# Patient Record
Sex: MALE | Race: WHITE | NOT HISPANIC OR LATINO | Employment: OTHER | URBAN - METROPOLITAN AREA
[De-identification: names, ages, dates, MRNs, and addresses within clinical notes are randomized per-mention and may not be internally consistent; named-entity substitution may affect disease eponyms.]

---

## 2017-09-22 ENCOUNTER — GENERIC CONVERSION - ENCOUNTER (OUTPATIENT)
Dept: OTHER | Facility: OTHER | Age: 42
End: 2017-09-22

## 2018-01-14 NOTE — PROGRESS NOTES
Patient Health Assessment    Date:            09/22/2017  Blood Pressure:  126/79  Pulse:           50  Age:             42  Weight:          160 lbs  Height/Length:   5' 10"  Body Mass Index: 23 0  Provider:        BETSY  Clinic:          [facility]  Medical Alert: Tobacco User    Mental Disorders    Stroke  Medications: Buspar    Paxil    Since Last Visit: Medical Alert: No Change    Medications: No Change    Allergies:        No Change  Pain Scale Type: Numeric Pain ScalePain Level: 0  Description:     Pt returns after an number of years  CC Pain in 10 and 11,  O: Badly worn down teeth and rocco ruby are present  A: Wear is secondary to mental condition and can not be corrected with out  extensive dental care  The cost of the care is out of his range  PLAN:    I will try to maintain his case w resins and treat teeth prn symptoms  Pt needs prophy w attention to the calculus on mesial of # 2 which is super  erupted       Simple restorative needs to be done   nv  start w resins on 10 and 11    ----- Signed on Friday, September 22, 2017 at 11:07:56 AM  -----  ----- Provider: BETSY - Ramón York DMD -- Clinic: [facility] -----

## 2018-03-08 ENCOUNTER — DOCTOR'S OFFICE (OUTPATIENT)
Dept: URBAN - METROPOLITAN AREA CLINIC 137 | Facility: CLINIC | Age: 43
Setting detail: OPHTHALMOLOGY
End: 2018-03-08
Payer: COMMERCIAL

## 2018-03-08 ENCOUNTER — RX ONLY (RX ONLY)
Age: 43
End: 2018-03-08

## 2018-03-08 ENCOUNTER — OPTICAL OFFICE (OUTPATIENT)
Dept: URBAN - METROPOLITAN AREA CLINIC 146 | Facility: CLINIC | Age: 43
Setting detail: OPHTHALMOLOGY
End: 2018-03-08
Payer: COMMERCIAL

## 2018-03-08 DIAGNOSIS — H52.4: ICD-10-CM

## 2018-03-08 DIAGNOSIS — H53.001: ICD-10-CM

## 2018-03-08 DIAGNOSIS — H52.223: ICD-10-CM

## 2018-03-08 PROCEDURE — V2103 SPHEROCYLINDR 4.00D/12-2.00D: HCPCS | Performed by: OPHTHALMOLOGY

## 2018-03-08 PROCEDURE — 92004 COMPRE OPH EXAM NEW PT 1/>: CPT | Performed by: OPHTHALMOLOGY

## 2018-03-08 PROCEDURE — V2020 VISION SVCS FRAMES PURCHASES: HCPCS | Performed by: OPHTHALMOLOGY

## 2018-03-08 ASSESSMENT — REFRACTION_AUTOREFRACTION
OS_AXIS: 172
OD_SPHERE: +3.50
OS_CYLINDER: +0.50
OS_SPHERE: +2.00
OD_CYLINDER: +1.25
OD_AXIS: 008

## 2018-03-08 ASSESSMENT — REFRACTION_MANIFEST
OS_VA2: 20/
OD_VA2: 20/
OD_VA2: 20/
OS_VA1: 20/
OS_VA1: 20/
OD_VA1: 20/
OD_VA3: 20/
OU_VA: 20/
OS_VA2: 20/
OS_VA3: 20/
OU_VA: 20/
OS_VA3: 20/
OD_VA1: 20/
OD_VA3: 20/

## 2018-03-08 ASSESSMENT — REFRACTION_OUTSIDERX
OD_SPHERE: +2.75
OS_CYLINDER: +0.50
OS_ADD: +1.00
OS_VA3: 20/
OS_VA2: 20/20(J1+)
OD_VA2: 20/20(J1+)
OD_VA3: 20/
OS_AXIS: 170
OS_SPHERE: +2.00
OD_AXIS: 10
OS_VA1: 20/20
OD_ADD: +1.00
OD_VA1: 20/25
OU_VA: 20/20-1
OD_CYLINDER: +0.75

## 2018-03-08 ASSESSMENT — REFRACTION_CURRENTRX
OD_OVR_VA: 20/
OS_OVR_VA: 20/
OD_OVR_VA: 20/
OS_OVR_VA: 20/
OD_OVR_VA: 20/
OS_OVR_VA: 20/

## 2018-03-08 ASSESSMENT — CONFRONTATIONAL VISUAL FIELD TEST (CVF)
OD_FINDINGS: FULL
OS_FINDINGS: FULL

## 2018-03-08 ASSESSMENT — VISUAL ACUITY
OS_BCVA: 20/200
OD_BCVA: 20/40

## 2018-03-08 ASSESSMENT — SPHEQUIV_DERIVED
OS_SPHEQUIV: 2.25
OD_SPHEQUIV: 4.125

## 2020-01-14 ENCOUNTER — OPTICAL OFFICE (OUTPATIENT)
Dept: URBAN - METROPOLITAN AREA CLINIC 146 | Facility: CLINIC | Age: 45
Setting detail: OPHTHALMOLOGY
End: 2020-01-14
Payer: COMMERCIAL

## 2020-01-14 ENCOUNTER — DOCTOR'S OFFICE (OUTPATIENT)
Dept: URBAN - METROPOLITAN AREA CLINIC 137 | Facility: CLINIC | Age: 45
Setting detail: OPHTHALMOLOGY
End: 2020-01-14
Payer: COMMERCIAL

## 2020-01-14 VITALS — HEIGHT: 60 IN

## 2020-01-14 DIAGNOSIS — H52.4: ICD-10-CM

## 2020-01-14 DIAGNOSIS — H52.03: ICD-10-CM

## 2020-01-14 DIAGNOSIS — H52.223: ICD-10-CM

## 2020-01-14 PROBLEM — H04.123 DRY EYE; BOTH EYES: Status: ACTIVE | Noted: 2018-03-08

## 2020-01-14 PROBLEM — H53.001 AMBLYOPIA NOS; RIGHT EYE: Status: ACTIVE | Noted: 2018-03-08

## 2020-01-14 PROCEDURE — V2203 LENS SPHCYL BIFOCAL 4.00D/.1: HCPCS | Performed by: OPTOMETRIST

## 2020-01-14 PROCEDURE — V2020 VISION SVCS FRAMES PURCHASES: HCPCS | Performed by: OPTOMETRIST

## 2020-01-14 PROCEDURE — 92014 COMPRE OPH EXAM EST PT 1/>: CPT | Performed by: OPTOMETRIST

## 2020-01-14 PROCEDURE — V2207 LENS SPHCY BIFOCAL 4.25-7D/.: HCPCS | Performed by: OPTOMETRIST

## 2020-01-14 ASSESSMENT — SPHEQUIV_DERIVED
OD_SPHEQUIV: 3.875
OS_SPHEQUIV: 2.375
OD_SPHEQUIV: 3.625
OS_SPHEQUIV: 2.25
OS_SPHEQUIV: 2.25
OD_SPHEQUIV: 3.125

## 2020-01-14 ASSESSMENT — REFRACTION_MANIFEST
OU_VA: 20/20-1
OS_VA1: 20/20
OD_VA1: 20/25
OS_SPHERE: +2.75
OD_SPHERE: +2.75
OS_ADD: +1.75
OD_AXIS: 10
OS_AXIS: 170
OD_AXIS: 105
OS_VA3: 20/
OU_VA: 20/
OD_ADD: +1.75
OD_VA3: 20/
OD_VA1: 20/25
OD_CYLINDER: -1.25
OS_VA2: 20/20(J1+)
OS_CYLINDER: -0.75
OS_ADD: +1.00
OD_CYLINDER: +0.75
OD_VA3: 20/
OS_CYLINDER: +0.50
OS_VA1: 20/20
OS_VA2: 20/
OD_SPHERE: +4.25
OS_VA3: 20/
OD_VA2: 20/20(J1+)
OD_ADD: +1.00
OD_VA2: 20/
OS_AXIS: 85
OS_SPHERE: +2.00

## 2020-01-14 ASSESSMENT — REFRACTION_CURRENTRX
OS_SPHERE: +2.75
OD_CYLINDER: -1.00
OS_AXIS: 079
OD_SPHERE: +3.75
OD_OVR_VA: 20/
OD_AXIS: 100
OS_OVR_VA: 20/
OS_CYLINDER: -0.50

## 2020-01-14 ASSESSMENT — REFRACTION_AUTOREFRACTION
OD_CYLINDER: -1.75
OS_SPHERE: +2.50
OD_AXIS: 100
OS_AXIS: 083
OS_CYLINDER: -0.50
OD_SPHERE: +4.75

## 2020-01-14 ASSESSMENT — VISUAL ACUITY
OS_BCVA: 20/40
OD_BCVA: 20/25-1

## 2020-01-14 ASSESSMENT — CONFRONTATIONAL VISUAL FIELD TEST (CVF)
OD_FINDINGS: FULL
OS_FINDINGS: FULL

## 2021-06-30 ENCOUNTER — HOSPITAL ENCOUNTER (OUTPATIENT)
Dept: RADIOLOGY | Facility: HOSPITAL | Age: 46
Discharge: HOME | End: 2021-06-30
Attending: FAMILY MEDICINE
Payer: COMMERCIAL

## 2021-06-30 DIAGNOSIS — J06.9 ACUTE UPPER RESPIRATORY INFECTION: ICD-10-CM

## 2021-06-30 PROCEDURE — 71046 X-RAY EXAM CHEST 2 VIEWS: CPT

## 2021-10-05 ENCOUNTER — HOSPITAL ENCOUNTER (OUTPATIENT)
Dept: RADIOLOGY | Facility: HOSPITAL | Age: 46
Discharge: HOME | End: 2021-10-05
Attending: FAMILY MEDICINE
Payer: COMMERCIAL

## 2021-10-05 DIAGNOSIS — M79.671 PAIN IN BOTH FEET: ICD-10-CM

## 2021-10-05 DIAGNOSIS — M79.672 PAIN IN BOTH FEET: ICD-10-CM

## 2021-10-05 PROCEDURE — 73630 X-RAY EXAM OF FOOT: CPT

## 2021-12-03 ENCOUNTER — TELEPHONE (OUTPATIENT)
Dept: SLEEP CENTER | Facility: CLINIC | Age: 46
End: 2021-12-03

## 2022-04-29 ENCOUNTER — HOSPITAL ENCOUNTER (EMERGENCY)
Facility: HOSPITAL | Age: 47
Discharge: HOME | End: 2022-04-29
Attending: EMERGENCY MEDICINE
Payer: COMMERCIAL

## 2022-04-29 VITALS
SYSTOLIC BLOOD PRESSURE: 146 MMHG | TEMPERATURE: 98.3 F | WEIGHT: 165 LBS | OXYGEN SATURATION: 99 % | DIASTOLIC BLOOD PRESSURE: 83 MMHG | RESPIRATION RATE: 16 BRPM | HEIGHT: 70 IN | HEART RATE: 92 BPM | BODY MASS INDEX: 23.62 KG/M2

## 2022-04-29 DIAGNOSIS — S01.01XA SCALP LACERATION: Primary | ICD-10-CM

## 2022-04-29 PROCEDURE — 90715 TDAP VACCINE 7 YRS/> IM: CPT | Performed by: EMERGENCY MEDICINE

## 2022-04-29 PROCEDURE — 12002 RPR S/N/AX/GEN/TRNK2.6-7.5CM: CPT | Performed by: EMERGENCY MEDICINE

## 2022-04-29 PROCEDURE — 90471 IMMUNIZATION ADMIN: CPT

## 2022-04-29 PROCEDURE — 99282 EMERGENCY DEPT VISIT SF MDM: CPT | Performed by: EMERGENCY MEDICINE

## 2022-04-29 PROCEDURE — 99282 EMERGENCY DEPT VISIT SF MDM: CPT

## 2022-04-29 RX ORDER — ROPIVACAINE HYDROCHLORIDE 5 MG/ML
10 INJECTION, SOLUTION EPIDURAL; INFILTRATION; PERINEURAL ONCE
Status: COMPLETED | OUTPATIENT
Start: 2022-04-29 | End: 2022-04-29

## 2022-04-29 RX ADMIN — ROPIVACAINE HYDROCHLORIDE 10 ML: 5 INJECTION, SOLUTION EPIDURAL; INFILTRATION; PERINEURAL at 17:55

## 2022-04-29 RX ADMIN — TETANUS TOXOID, REDUCED DIPHTHERIA TOXOID AND ACELLULAR PERTUSSIS VACCINE, ADSORBED 0.5 ML: 5; 2.5; 8; 8; 2.5 SUSPENSION INTRAMUSCULAR at 17:40

## 2022-04-29 NOTE — DISCHARGE INSTRUCTIONS
Follow-up with your primary care provider in approximately 10 days for removal of your stitches  Five stitches were placed  Come back to emergency department if he has signs of infection including fever, spreading redness, pus discharge      Come back to emergency department if you have signs of an intracranial bleed such as stroke like symptoms (change in vision, hearing, numbness, tingling, focal weakness, facial droop)

## 2022-04-29 NOTE — Clinical Note
Thania Melendez was seen and treated in our emergency department on 4/29/2022  Diagnosis: Laceration    Abi Guest  may return to work on return date  He may return on this date: 04/30/2022         If you have any questions or concerns, please don't hesitate to call        Yassine Max, DO    ______________________________           _______________          _______________  Hospital Representative                              Date                                Time

## 2022-04-29 NOTE — ED PROVIDER NOTES
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  Pt  States no LOC, no nausea, no vomiting     27-year-old male not up-to-date on tetanus  Patient presents for laceration to head  Had a metal bed frame fall onto the upper right side of his scalp  Patient was standing when he was struck by the metal frame which fell from the wall or standing up  Not a large fall  Less than a foot  Pain/ bleeding is controlled this time  No chest pain, shortness of breath, dizziness, loss consciousness, neurological deficits  Fall was from a small height  Patient would prefer not have a CT scan of his head  Head Laceration  Location:  Head/neck  Head/neck laceration location:  Scalp  Length:  5 cm  Depth: Through dermis  Bleeding: venous and controlled    Laceration mechanism:  Blunt object  Pain details:     Quality:  Aching    Severity:  Mild    Timing:  Constant    Progression:  Unchanged  Foreign body present:  No foreign bodies      None       History reviewed  No pertinent past medical history  History reviewed  No pertinent surgical history  History reviewed  No pertinent family history  I have reviewed and agree with the history as documented  E-Cigarette/Vaping    E-Cigarette Use Never User      E-Cigarette/Vaping Substances     Social History     Tobacco Use    Smoking status: Current Every Day Smoker     Packs/day: 0 50     Years: 41 00     Pack years: 20 50    Smokeless tobacco: Never Used   Vaping Use    Vaping Use: Never used   Substance Use Topics    Alcohol use: Yes     Comment: Socially- Holiday 1-2 times per month    Drug use: Not Currently       Review of Systems   Skin: Positive for wound  All other systems reviewed and are negative  Physical Exam  Physical Exam  Vitals and nursing note reviewed  Constitutional:       General: He is not in acute distress       Appearance: He is well-developed  He is not diaphoretic  HENT:      Head: Normocephalic  Right Ear: External ear normal       Left Ear: External ear normal    Eyes:      Conjunctiva/sclera: Conjunctivae normal    Neck:      Vascular: No JVD  Trachea: No tracheal deviation  Cardiovascular:      Rate and Rhythm: Normal rate and regular rhythm  Heart sounds: Normal heart sounds  No murmur heard  Pulmonary:      Effort: No respiratory distress  Breath sounds: Normal breath sounds  No stridor  No wheezing or rales  Abdominal:      General: Bowel sounds are normal  There is no distension  Palpations: Abdomen is soft  There is no mass  Tenderness: There is no abdominal tenderness  There is no guarding or rebound  Musculoskeletal:         General: No tenderness or deformity  Skin:     General: Skin is warm and dry  Capillary Refill: Capillary refill takes less than 2 seconds  Coloration: Skin is not pale  Findings: No erythema or rash  Comments: Proximally 5 cm wound to the right frontal region just posterior to the scalp line  Neurological:      Cranial Nerves: No cranial nerve deficit  Sensory: No sensory deficit  Motor: No weakness or abnormal muscle tone  Coordination: Coordination normal       Gait: Gait normal       Comments: Negative point-to-point  Negative pronator drift  Psychiatric:         Behavior: Behavior normal          Thought Content:  Thought content normal          Judgment: Judgment normal          Vital Signs  ED Triage Vitals   Temperature Pulse Respirations Blood Pressure SpO2   04/29/22 1724 04/29/22 1724 04/29/22 1724 04/29/22 1724 04/29/22 1724   98 3 °F (36 8 °C) 92 16 146/83 99 %      Temp Source Heart Rate Source Patient Position - Orthostatic VS BP Location FiO2 (%)   04/29/22 1724 04/29/22 1724 04/29/22 1724 04/29/22 1724 --   Oral Monitor Lying Left arm       Pain Score       04/29/22 1730       5           Vitals: 04/29/22 1724   BP: 146/83   Pulse: 92   Patient Position - Orthostatic VS: Lying         Visual Acuity  Visual Acuity      Most Recent Value   L Pupil Size (mm) 3   R Pupil Size (mm) 3          ED Medications  Medications   tetanus-diphtheria-acellular pertussis (BOOSTRIX) IM injection 0 5 mL (0 5 mL Intramuscular Given 4/29/22 1740)   ropivacaine (NAROPIN) 0 5 % injection 10 mL (10 mL Infiltration Given 4/29/22 1755)       Diagnostic Studies  Results Reviewed     None                 No orders to display              Procedures  Laceration repair    Date/Time: 4/29/2022 6:13 PM  Performed by: Nano Adan DO  Authorized by: Nano Adan DO   Consent: The procedure was performed in an emergent situation  Consent given by: patient  Patient identity confirmed: verbally with patient  Body area: head/neck  Location details: scalp  Laceration length: 5 cm  Tendon involvement: none  Nerve involvement: none  Vascular damage: no  Anesthesia: local infiltration    Anesthesia:  Local anesthetic: ropivicaine 0 5%  Anesthetic total: 5 mL    Sedation:  Patient sedated: no      Wound Dehiscence:  Superficial Wound Dehiscence: simple closure      Procedure Details:  Irrigation solution: saline  Irrigation method: syringe  Amount of cleaning: standard  Debridement: none  Degree of undermining: none  Skin closure: 4-0 nylon  Number of sutures: 5  Technique: simple  Approximation: close  Approximation difficulty: simple               ED Course                                             MDM  Number of Diagnoses or Management Options  Scalp laceration: new and requires workup  Diagnosis management comments: 5 cm wound to the scalp  Closed with 5 sutures after copiously irrigating and anesthetizing  Patient is [facility] CT head rules negative  Patient is requesting to not have a CT scan performed  No cervical spine tenderness  No neurological deficits on examination  Will update tetanus      Remove stitches in approximately 10 days  Return precautions given  Patient expressed understanding these return precautions  Risk of Complications, Morbidity, and/or Mortality  Presenting problems: low  Diagnostic procedures: minimal  Management options: low        Disposition  Final diagnoses:   Scalp laceration     Time reflects when diagnosis was documented in both MDM as applicable and the Disposition within this note     Time User Action Codes Description Comment    4/29/2022  6:14 PM Shannan Maciel Add [S01 01XA] Scalp laceration       ED Disposition     ED Disposition Condition Date/Time Comment    Discharge Stable Fri Apr 29, 2022  6:14 PM Rere Beat discharge to home/self care  Follow-up Information     Follow up With Specialties Details Why Contact Info Additional Information    Jennifer Washington MD Family Medicine In 10 days For suture removal [address]  [address] [phone]       [address] Emergency Department Emergency Medicine  If symptoms worsen [address] [facility],[address] [id number]  [address] Emergency Department, [address], [address] [facility] [place]leen           There are no discharge medications for this patient  No discharge procedures on file      [address] Review     None          ED Provider  Electronically Signed by           Rolando Dakins, DO  04/29/22 7029

## 2023-08-11 ENCOUNTER — APPOINTMENT (EMERGENCY)
Dept: CT IMAGING | Facility: HOSPITAL | Age: 48
End: 2023-08-11
Payer: COMMERCIAL

## 2023-08-11 ENCOUNTER — APPOINTMENT (EMERGENCY)
Dept: RADIOLOGY | Facility: HOSPITAL | Age: 48
End: 2023-08-11
Payer: COMMERCIAL

## 2023-08-11 ENCOUNTER — HOSPITAL ENCOUNTER (EMERGENCY)
Facility: HOSPITAL | Age: 48
Discharge: HOME | End: 2023-08-11
Attending: EMERGENCY MEDICINE
Payer: COMMERCIAL

## 2023-08-11 VITALS
RESPIRATION RATE: 20 BRPM | WEIGHT: 165 LBS | DIASTOLIC BLOOD PRESSURE: 94 MMHG | TEMPERATURE: 99.1 F | OXYGEN SATURATION: 96 % | HEART RATE: 105 BPM | HEIGHT: 70 IN | BODY MASS INDEX: 23.62 KG/M2 | SYSTOLIC BLOOD PRESSURE: 136 MMHG

## 2023-08-11 DIAGNOSIS — M25.551 RIGHT HIP PAIN: ICD-10-CM
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
## 2023-08-11 DIAGNOSIS — M54.2 NECK PAIN: ICD-10-CM

## 2023-08-11 DIAGNOSIS — S09.90XA CLOSED HEAD INJURY, INITIAL ENCOUNTER: Primary | ICD-10-CM

## 2023-08-11 PROCEDURE — G1004 CDSM NDSC: HCPCS

## 2023-08-11 PROCEDURE — 73502 X-RAY EXAM HIP UNI 2-3 VIEWS: CPT

## 2023-08-11 PROCEDURE — 99285 EMERGENCY DEPT VISIT HI MDM: CPT | Performed by: EMERGENCY MEDICINE

## 2023-08-11 PROCEDURE — 70450 CT HEAD/BRAIN W/O DYE: CPT

## 2023-08-11 PROCEDURE — 72125 CT NECK SPINE W/O DYE: CPT

## 2023-08-11 PROCEDURE — 99284 EMERGENCY DEPT VISIT MOD MDM: CPT

## 2023-08-11 RX ORDER — METHOCARBAMOL 500 MG/1
500 TABLET, FILM COATED ORAL 3 TIMES DAILY PRN
COMMUNITY
Start: 2023-03-27

## 2023-08-11 RX ORDER — NAPROXEN 500 MG/1
500 TABLET ORAL
COMMUNITY
Start: 2023-03-27

## 2023-08-11 RX ORDER — ACETAMINOPHEN 325 MG/1
650 TABLET ORAL ONCE
Status: COMPLETED | OUTPATIENT
Start: 2023-08-11 | End: 2023-08-11

## 2023-08-11 RX ORDER — IBUPROFEN 600 MG/1
600 TABLET ORAL EVERY 6 HOURS PRN
Qty: 30 TABLET | Refills: 0 | Status: SHIPPED | OUTPATIENT
Start: 2023-08-11

## 2023-08-11 RX ADMIN — ACETAMINOPHEN 650 MG: 325 TABLET, FILM COATED ORAL at 20:33

## 2023-08-11 NOTE — Clinical Note
Bobby Iyer was seen and treated in our emergency department on 8/11/2023.    ?    ? ? Diagnosis: ?    Gissell Luis  may return to work on return date. He may return on this date: 08/14/2023    ? If you have any questions or concerns, please don't hesitate to call.       Nafisa Falcon MD    ______________________________           _______________          _______________  Hospital Representative                              Date                                Time

## 2023-08-11 NOTE — Clinical Note
accompanied Tiagoely Mona to the emergency department on 8/11/2023. Return date if applicable: [id number]/[id number]        If you have any questions or concerns, please don't hesitate to call.       Beatrice Torres MD
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
## 2024-07-23 ENCOUNTER — APPOINTMENT (OUTPATIENT)
Dept: LAB | Facility: HOSPITAL | Age: 49
End: 2024-07-23
Payer: COMMERCIAL

## 2024-07-23 DIAGNOSIS — Z02.89 OTHER GENERAL MEDICAL EXAMINATION FOR ADMINISTRATIVE PURPOSES: ICD-10-CM

## 2024-07-23 PROCEDURE — 86480 TB TEST CELL IMMUN MEASURE: CPT

## 2024-07-23 PROCEDURE — 36415 COLL VENOUS BLD VENIPUNCTURE: CPT

## 2024-07-24 LAB
GAMMA INTERFERON BACKGROUND BLD IA-ACNC: 0.12 IU/ML
M TB IFN-G BLD-IMP: NEGATIVE
M TB IFN-G CD4+ BCKGRND COR BLD-ACNC: -0.01 IU/ML
M TB IFN-G CD4+ BCKGRND COR BLD-ACNC: 0 IU/ML
MITOGEN IGNF BCKGRD COR BLD-ACNC: 9.35 IU/ML